# Patient Record
Sex: FEMALE | URBAN - METROPOLITAN AREA
[De-identification: names, ages, dates, MRNs, and addresses within clinical notes are randomized per-mention and may not be internally consistent; named-entity substitution may affect disease eponyms.]

---

## 2018-03-20 ENCOUNTER — TELEPHONE (OUTPATIENT)
Dept: SURGERY | Facility: CLINIC | Age: 73
End: 2018-03-20

## 2018-03-20 NOTE — TELEPHONE ENCOUNTER
Called patient after receiving referral from Dr. Hanley's office. Patient and I discussed her abnormal mammogram, and the need for further interpretation by our radiologists. Patient states she told her MD she wanted to go to [facility], not [facility]. She requests that I call Dr. Hanley's office and relay this information.     Called Dr. Hanley's office and they verified that they also sent referral to [place]. Told them I would disregard this referral, they verbalized understanding.